# Patient Record
Sex: FEMALE | Race: OTHER | HISPANIC OR LATINO
[De-identification: names, ages, dates, MRNs, and addresses within clinical notes are randomized per-mention and may not be internally consistent; named-entity substitution may affect disease eponyms.]

---

## 2020-11-24 PROBLEM — Z00.00 ENCOUNTER FOR PREVENTIVE HEALTH EXAMINATION: Status: ACTIVE | Noted: 2020-11-24

## 2020-11-25 ENCOUNTER — APPOINTMENT (OUTPATIENT)
Dept: OTOLARYNGOLOGY | Facility: CLINIC | Age: 60
End: 2020-11-25
Payer: MEDICAID

## 2020-11-25 VITALS
SYSTOLIC BLOOD PRESSURE: 112 MMHG | HEIGHT: 59 IN | TEMPERATURE: 96.6 F | BODY MASS INDEX: 20.12 KG/M2 | DIASTOLIC BLOOD PRESSURE: 69 MMHG | HEART RATE: 76 BPM | WEIGHT: 99.8 LBS

## 2020-11-25 DIAGNOSIS — Z87.39 PERSONAL HISTORY OF OTHER DISEASES OF THE MUSCULOSKELETAL SYSTEM AND CONNECTIVE TISSUE: ICD-10-CM

## 2020-11-25 DIAGNOSIS — Z87.19 PERSONAL HISTORY OF OTHER DISEASES OF THE DIGESTIVE SYSTEM: ICD-10-CM

## 2020-11-25 DIAGNOSIS — Z87.09 PERSONAL HISTORY OF OTHER DISEASES OF THE RESPIRATORY SYSTEM: ICD-10-CM

## 2020-11-25 DIAGNOSIS — J30.2 OTHER SEASONAL ALLERGIC RHINITIS: ICD-10-CM

## 2020-11-25 DIAGNOSIS — Z86.69 PERSONAL HISTORY OF OTHER DISEASES OF THE NERVOUS SYSTEM AND SENSE ORGANS: ICD-10-CM

## 2020-11-25 DIAGNOSIS — J30.89 OTHER ALLERGIC RHINITIS: ICD-10-CM

## 2020-11-25 PROCEDURE — 31575 DIAGNOSTIC LARYNGOSCOPY: CPT

## 2020-11-25 PROCEDURE — 99204 OFFICE O/P NEW MOD 45 MIN: CPT | Mod: 25

## 2020-11-25 RX ORDER — MONTELUKAST 10 MG/1
10 TABLET, FILM COATED ORAL
Refills: 0 | Status: ACTIVE | COMMUNITY

## 2020-11-25 RX ORDER — MYCOPHENILIC ACID 360 MG/1
360 TABLET, DELAYED RELEASE ORAL
Refills: 0 | Status: ACTIVE | COMMUNITY

## 2020-11-25 RX ORDER — HYDROXYCHLOROQUINE SULFATE 200 MG/1
200 TABLET, FILM COATED ORAL
Refills: 0 | Status: ACTIVE | COMMUNITY

## 2020-11-29 PROBLEM — Z87.39 HISTORY OF ARTHRITIS: Status: RESOLVED | Noted: 2020-11-29 | Resolved: 2020-11-29

## 2020-11-29 PROBLEM — Z86.69 HISTORY OF GLAUCOMA: Status: RESOLVED | Noted: 2020-11-29 | Resolved: 2020-11-29

## 2020-11-29 PROBLEM — J30.89 ALLERGY TO DUST: Status: ACTIVE | Noted: 2020-11-29

## 2020-11-29 PROBLEM — Z87.09 HISTORY OF ASTHMA: Status: RESOLVED | Noted: 2020-11-29 | Resolved: 2020-11-29

## 2020-11-29 PROBLEM — Z87.39 HISTORY OF OSTEOPOROSIS: Status: RESOLVED | Noted: 2020-11-29 | Resolved: 2020-11-29

## 2020-11-29 PROBLEM — Z87.19 HISTORY OF HEMORRHOIDS: Status: RESOLVED | Noted: 2020-11-29 | Resolved: 2020-11-29

## 2020-11-29 PROBLEM — J30.2 SEASONAL ALLERGIES: Status: ACTIVE | Noted: 2020-11-29

## 2020-11-29 NOTE — PROCEDURE
[de-identified] : \par Indication: Reflux\par -Verbal consent was obtained from patient prior to procedure.\par -Kishore-Synephrine and lidocaine 2% spray applied to the nasal cavities.\par Flexible laryngoscopy was performed via right nostril and revealed the following:\par   -- Nasopharynx had no mass or exudate.\par   -- Base of tongue was symmetric and not enlarged.\par   -- Vallecula was clear\par   -- Epiglottis, both aryepiglottic folds and both false vocal folds were normal\par   -- Arytenoids both with moderate edema and erythema \par   -- True vocal folds were fully mobile and without lesions. \par   -- Post cricoid area was congested\par   -- Interarytenoid edema was present     \par \par The patient tolerated the procedure well.\par \par

## 2020-11-29 NOTE — REVIEW OF SYSTEMS
[Patient Intake Form Reviewed] : Patient intake form was reviewed [As Noted in HPI] : as noted in HPI [Eyesight Problems] : eyesight problems [Shortness Of Breath] : shortness of breath [Constipation] : constipation [Heartburn] : heartburn [Joint Pain] : joint pain [Joint Stiffness] : joint stiffness [Itching] : itching [Negative] : Heme/Lymph [FreeTextEntry9] : back pain [de-identified] : headache [de-identified] : mood swings

## 2020-11-29 NOTE — ASSESSMENT
[FreeTextEntry1] : Ms. Kevin Whitehead has chronic intermittent throat irritation x last 10 months, along with throat clearing and dysphagia, that seem due to active reflux\par -->start famotidine to take in addition to the omeprazole\par -->reflux precautions reviewed with her\par --> GI follow up as scheduled\par \par Return in 3 months\par

## 2020-11-29 NOTE — CONSULT LETTER
[Dear  ___] : Dear  [unfilled], [( Thank you for referring [unfilled] for consultation for _____ )] : Thank you for referring [unfilled] for consultation for [unfilled] [Please see my note below.] : Please see my note below. [Consult Closing:] : Thank you very much for allowing me to participate in the care of this patient.  If you have any questions, please do not hesitate to contact me. [Sincerely,] : Sincerely, [DrAbdullahi  ___] : Dr. NICHOLS [FreeTextEntry2] : Kumar Mead MD\par [address]\[place] [address]\par  [FreeTextEntry3] : \par Cinthia Vazquez MD \par Otolaryngology, Head and Neck Surgery \par \par

## 2020-11-29 NOTE — PHYSICAL EXAM
[Midline] : trachea located in midline position [Normal] : no rashes [] : septum deviated to the left [Laryngoscopy Performed] : laryngoscopy was performed, see procedure section for findings [FreeTextEntry1] : No hoarseness. [de-identified] : 1+ bilateral, no debris or erythema. [de-identified] : Carotid pulses 2+ bilateral.

## 2020-11-29 NOTE — HISTORY OF PRESENT ILLNESS
[de-identified] : Ms Kevin Whitehead is a 60 year old woman who is seen in consultation request from Dr. Mead for throat pain.\par Communication was facilitated by language line interpreters Yi #[id number].\par \par Chronic intermittent throat pain for 10 months; worse in the morning when she wakes up.  Pain is described as a burning sensation. Clearing throat a lot; feels like something is stuck in her throat.  Difficult to swallow solids at times. Dryness in throat also.  May have scleroderma.  No change in sx after antibiotics.\par History of acid reflux, on omeprazole every day but still has heartburn.  Endoscopy 2 years ago showed irritation in her stomach; reports that they gave her antibiotics afterward (?H.pylori).  Has new appt with GI Dr. Madison next month.\par Has asthma and surrounded by dust at home.  Also has seasonal allergies, on medications.\par No change in voice, recent illness or fever.\par

## 2021-02-25 ENCOUNTER — APPOINTMENT (OUTPATIENT)
Dept: OTOLARYNGOLOGY | Facility: CLINIC | Age: 61
End: 2021-02-25

## 2021-10-27 ENCOUNTER — APPOINTMENT (OUTPATIENT)
Dept: OTOLARYNGOLOGY | Facility: CLINIC | Age: 61
End: 2021-10-27
Payer: MEDICAID

## 2021-10-27 VITALS
TEMPERATURE: 96.8 F | HEIGHT: 59 IN | DIASTOLIC BLOOD PRESSURE: 70 MMHG | BODY MASS INDEX: 19.15 KG/M2 | WEIGHT: 95 LBS | SYSTOLIC BLOOD PRESSURE: 109 MMHG | HEART RATE: 68 BPM

## 2021-10-27 DIAGNOSIS — J18.9 PNEUMONIA, UNSPECIFIED ORGANISM: ICD-10-CM

## 2021-10-27 PROCEDURE — 99214 OFFICE O/P EST MOD 30 MIN: CPT | Mod: 25

## 2021-10-27 PROCEDURE — 31575 DIAGNOSTIC LARYNGOSCOPY: CPT

## 2021-10-27 RX ORDER — BECLOMETHASONE DIPROPIONATE 80 UG/1
AEROSOL, METERED RESPIRATORY (INHALATION)
Refills: 0 | Status: COMPLETED | COMMUNITY
End: 2021-10-27

## 2021-10-27 RX ORDER — TRAZODONE HYDROCHLORIDE 50 MG/1
50 TABLET ORAL
Refills: 0 | Status: COMPLETED | COMMUNITY
End: 2021-10-27

## 2021-10-27 RX ORDER — FAMOTIDINE 20 MG/1
20 TABLET, FILM COATED ORAL
Qty: 30 | Refills: 2 | Status: COMPLETED | COMMUNITY
Start: 2020-11-25 | End: 2021-10-27

## 2021-10-27 RX ORDER — LINACLOTIDE 145 UG/1
145 CAPSULE, GELATIN COATED ORAL
Refills: 0 | Status: COMPLETED | COMMUNITY
End: 2021-10-27

## 2021-10-27 NOTE — PHYSICAL EXAM
[Midline] : trachea located in midline position [FreeTextEntry1] : No hoarseness.  [] : septum deviated to the left [Laryngoscopy Performed] : laryngoscopy was performed, see procedure section for findings [de-identified] : Slight white coating on dorsal tongue, which is thickened and has tiny fissures; no lesions. [de-identified] : 1+ bilateral [de-identified] : Red patches flat on hard palate mucosa; tristen slightly with pressue. [Normal] : no neck adenopathy

## 2021-10-27 NOTE — ASSESSMENT
[FreeTextEntry1] : Ms. MOE MCCRARY was evaluated for the following issues today:\par \par 1.) Glossitis due to fungal infection, likely after antibiotics and inhaler use.  \par Hx of esophageal candidiasis this year.\par --> start clotrimazole lozenges 5s/day x 2 weeks\par --> rinse mouth after steroid inhalers\par --> follow up with GI doctor, Dr. Madison\par \par 2.)  Reflux not controlled, on omeprazole and reflux precautions\par --> continue current meds\par --> f/u with GI\par \par 3.) Lung infection - ?TB.\par --> she is to f/u w/ her pulmonologist\par \par Return in 3 weeks

## 2021-10-27 NOTE — CONSULT LETTER
[Dear  ___] : Dear  [unfilled], [Courtesy Letter:] : I had the pleasure of seeing your patient, [unfilled], in my office today. [Please see my note below.] : Please see my note below. [Consult Closing:] : Thank you very much for allowing me to participate in the care of this patient.  If you have any questions, please do not hesitate to contact me. [Sincerely,] : Sincerely, [FreeTextEntry2] : Kumar Mead MD\par [address]\[place] [address]\par  [FreeTextEntry3] : \par Cinthia Vazquez MD \par Otolaryngology, Head and Neck Surgery \par \par  [DrAbdullahi  ___] : Dr. NICHOLS

## 2021-10-27 NOTE — PROCEDURE
[de-identified] : \par Indication: reflux\par -Verbal consent was obtained from patient prior to procedure.\par Flexible laryngoscopy was performed via  mouth and revealed the following:\par   -- Base of tongue was symmetric and not enlarged.\par   -- Vallecula was clear\par   -- Epiglottis, both aryepiglottic folds and both false vocal folds were normal\par   -- Arytenoids both with mild edema and erythema \par   -- True vocal folds were fully mobile and without lesions. \par   -- Post cricoid area was clear.\par   -- Interarytenoid edema was present     \par   -- No lesions or white/red plaques seen in laryngopharynx\par \par The patient tolerated the procedure well.\par

## 2021-10-27 NOTE — HISTORY OF PRESENT ILLNESS
[de-identified] : Ms. MOE MCCRARY presents today with new c/o fungus in her mouth.\par PMH:  Scleroderma, asthma, GERD (?H.pylori hx), arthritis, glaucoma, osteoporosis\par PCP:  Dr. Mead\par Communication was facilitated by language line  [place]se #[id number].\par \par White stuff on tongue and roof of mouth daily x 2 mouths. She picks off the white stuff from her mouth every day but worse in morning when she wakes from sleep.  No pain in mouth.  Able to swallow easily.  No voice change.  \par Nystatin S&S, prescribed by her PCP, was hard for her to use QID so she sometimes only used twice daily --> no change in symptoms.\par In early in 2021, she had same white coating in her mouth ; resolved after washing mouth with vinegar daily.  Tried vinegar this time too but didn’t help. \par She reports esophageal candidiasis was diagnosed on endoscopy earlier this year; treated with an antibiotic x 10 days.  She gets frequent burning in her chest, even on daily omeprazole.  She last saw her GI doctor over 2 months ago. \par Recently on azithromycin, rifampin and ethambutol for a "infection in the lungs". (does not recognize the terms TB or tuberculosis.)  Her pulmonary doctor told her to stop the medications 1 week ago because she feels that the meds  caused her tongue symptoms.\par On Wixela inhaler daily, plus PRN albuterol.\par \par INITIAL VISIT 11/25/2021:\par Chronic intermittent throat pain for 10 months; worse in the morning when she wakes up. Pain is described as a burning sensation. Clearing throat a lot; feels like something is stuck in her throat. Difficult to swallow solids at times. Dryness in throat also. May have scleroderma. No change in sx after antibiotics.\par History of acid reflux, on omeprazole every day but still has heartburn. Endoscopy 2 years ago showed irritation in her stomach; reports that they gave her antibiotics afterward (?H.pylori). Has new appt with GI Dr. Madison next month.\par Has asthma and surrounded by dust at home. Also has seasonal allergies, on medications.\par No change in voice, recent illness or fever.

## 2021-11-24 ENCOUNTER — APPOINTMENT (OUTPATIENT)
Dept: OTOLARYNGOLOGY | Facility: CLINIC | Age: 61
End: 2021-11-24
Payer: MEDICAID

## 2021-11-24 VITALS
WEIGHT: 95 LBS | DIASTOLIC BLOOD PRESSURE: 69 MMHG | TEMPERATURE: 98 F | BODY MASS INDEX: 19.15 KG/M2 | SYSTOLIC BLOOD PRESSURE: 100 MMHG | HEIGHT: 59 IN | HEART RATE: 66 BPM

## 2021-11-24 PROCEDURE — 31575 DIAGNOSTIC LARYNGOSCOPY: CPT

## 2021-11-24 PROCEDURE — 99214 OFFICE O/P EST MOD 30 MIN: CPT | Mod: 25

## 2021-11-24 RX ORDER — CLOTRIMAZOLE 10 MG/1
10 LOZENGE ORAL
Qty: 150 | Refills: 0 | Status: COMPLETED | COMMUNITY
Start: 2021-10-27 | End: 2021-11-11

## 2021-11-24 NOTE — PROCEDURE
[de-identified] : \par Indication:  throat pain\par -Verbal consent was obtained from patient prior to procedure.\par Kishore-Synephrine spray was applied to the nasal cavities.\par Flexible laryngoscopy was performed via right nostril and revealed the following:\par   -- NP clear.\par   -- Base of tongue was symmetric and not enlarged.\par   -- Vallecula was clear\par   -- Epiglottis, both aryepiglottic folds and both false vocal folds were normal in shape by mild erythema.\par   -- Arytenoids both with mild edema and erythema \par   -- True vocal folds were fully mobile and without lesions. \par   -- Post cricoid area was clear.\par   -- Interarytenoid edema was present     \par   -- No lesions or white/red plaques seen in laryngopharynx but overall pharyngeal mucosa with mild to moderate erythema, minimal edema.\par \par The patient tolerated the procedure well.\par

## 2021-11-24 NOTE — HISTORY OF PRESENT ILLNESS
[de-identified] : Ms. MOE MCCRARY was seen in f/up for oral candidiasis.\par PMH:  Scleroderma, asthma, GERD (?H.pylori hx), arthritis, glaucoma, osteoporosis\par PCP:  Dr. Mead\par Communication was facilitated by language line  Kyrgyz #[id number].\par \guido Started on clotrimazole lozenges 3 weeks ago for oral candidiasis. The white coating and redness on tongue and hard palate disappeared but started to return over past 2 days.  Every morning she washes her mouth with vinegar. She is rinsing her mouth after using her steroid inhalers, as instructed.\par Reflux is improved; occurs when she take osteoporosis medications. She is on omeprazole 40 mg every day.\par She saw the GI doctor, who scheduled a December 6 procedure where they put something in her nose for 24 hours for reflux. (pH probe?)\par Pulmonary and ID doctors ordered CT chest to see if the lung infection is gone.\par On Saturday 11/20/21, she ate something hard, after which she had throat pain for 2 days. She felt like something was stuck in her throat and she had trouble swallowing her saliva. Sx disappeared 3 days ago. Today she has just a little discomfort in the throat.\par \par VISIT 10/27/2021\par White stuff on tongue and roof of mouth daily x 2 mouths. She picks off the white stuff from her mouth every day but worse in morning when she wakes from sleep.  No pain in mouth.  Able to swallow easily.  No voice change.  \par Nystatin S&S, prescribed by her PCP, was hard for her to use QID so she sometimes only used twice daily --> no change in symptoms.\par In early in 2021, she had same white coating in her mouth ; resolved after washing mouth with vinegar daily.  Tried vinegar this time too but didn’t help. \par She reports esophageal candidiasis was diagnosed on endoscopy earlier this year; treated with an antibiotic x 10 days.  She gets frequent burning in her chest, even on daily omeprazole.  She last saw her GI doctor over 2 months ago. \par Recently on azithromycin, rifampin and ethambutol for a "infection in the lungs". (does not recognize the terms TB or tuberculosis.)  Her pulmonary doctor told her to stop the medications 1 week ago because she feels that the meds  caused her tongue symptoms.\par On Wixela inhaler daily, plus PRN albuterol.\par \par INITIAL VISIT 11/25/2021:\par Chronic intermittent throat pain for 10 months; worse in the morning when she wakes up. Pain is described as a burning sensation. Clearing throat a lot; feels like something is stuck in her throat. Difficult to swallow solids at times. Dryness in throat also. May have scleroderma. No change in sx after antibiotics.\par History of acid reflux, on omeprazole every day but still has heartburn. Endoscopy 2 years ago showed irritation in her stomach; reports that they gave her antibiotics afterward (?H.pylori). Has new appt with GI Dr. Madison next month.\par Has asthma and surrounded by dust at home. Also has seasonal allergies, on medications.\par No change in voice, recent illness or fever.

## 2021-11-24 NOTE — ASSESSMENT
[FreeTextEntry1] : Ms. MOE MCCRARY was evaluated for the following issues today:\par \par 1.) Oral candidiasis resolved for about 10 days after treated with 2 weeks of clotrimazole lozenges but came back a few days ago.  Now the throat appears globally inflamed, although no white plaques; suspect candida.  \par Hx of esophageal candidiasis early this year, treated with oral pills (fluconazole?).\par --> restart clotrimazole lozenges 5s/day, this time x 1 month\par --> Fluconazole 100 mg daily x 2 weeks.\par --> continue rinse mouth after steroid inhalers\par \par 2.)  Reflux better controlled, on omeprazole and reflux precautions\par --> continue current meds\par --> f/u with GI\par \par Return in 6 weeks

## 2021-11-24 NOTE — PHYSICAL EXAM
[] : septum deviated to the left [Midline] : trachea located in midline position [Laryngoscopy Performed] : laryngoscopy was performed, see procedure section for findings [FreeTextEntry1] : No hoarseness.  [de-identified] : No white coating on dorsal tongue, just erythema and mild midline fissure. [de-identified] : 1+ bilateral [de-identified] : Small flat red patches on hard palate mucosa; fewer than at prior visit. [Normal] : no neck adenopathy

## 2021-11-24 NOTE — CONSULT LETTER
[Dear  ___] : Dear  [unfilled], [Courtesy Letter:] : I had the pleasure of seeing your patient, [unfilled], in my office today. [Please see my note below.] : Please see my note below. [Consult Closing:] : Thank you very much for allowing me to participate in the care of this patient.  If you have any questions, please do not hesitate to contact me. [Sincerely,] : Sincerely, [FreeTextEntry2] : Kumar Mead MD\par [address]\[place] [address]\par  [FreeTextEntry3] : \par Cinthia Vazquez MD \par Otolaryngology, Head and Neck Surgery \par \par  [DrAbdullahi  ___] : Dr. NICHOLS

## 2022-01-19 ENCOUNTER — APPOINTMENT (OUTPATIENT)
Dept: OTOLARYNGOLOGY | Facility: CLINIC | Age: 62
End: 2022-01-19
Payer: MEDICAID

## 2022-01-19 VITALS
HEART RATE: 70 BPM | TEMPERATURE: 98.3 F | WEIGHT: 95 LBS | HEIGHT: 59 IN | DIASTOLIC BLOOD PRESSURE: 68 MMHG | SYSTOLIC BLOOD PRESSURE: 122 MMHG | BODY MASS INDEX: 19.15 KG/M2

## 2022-01-19 DIAGNOSIS — U07.1 COVID-19: ICD-10-CM

## 2022-01-19 DIAGNOSIS — B37.89 OTHER SITES OF CANDIDIASIS: ICD-10-CM

## 2022-01-19 DIAGNOSIS — B37.81 CANDIDAL ESOPHAGITIS: ICD-10-CM

## 2022-01-19 PROCEDURE — 31575 DIAGNOSTIC LARYNGOSCOPY: CPT

## 2022-01-19 PROCEDURE — 99214 OFFICE O/P EST MOD 30 MIN: CPT | Mod: 25

## 2022-01-19 RX ORDER — FLUCONAZOLE 100 MG/1
100 TABLET ORAL DAILY
Qty: 14 | Refills: 0 | Status: COMPLETED | COMMUNITY
Start: 2021-11-24 | End: 2022-01-19

## 2022-02-22 PROBLEM — B37.81 CANDIDIASIS, ESOPHAGEAL: Status: RESOLVED | Noted: 2021-10-27 | Resolved: 2022-02-22

## 2022-02-22 PROBLEM — U07.1 COVID-19 VIRUS INFECTION: Status: RESOLVED | Noted: 2022-02-22 | Resolved: 2022-02-22

## 2022-02-22 PROBLEM — B37.89 PHARYNGEAL CANDIDIASIS: Status: RESOLVED | Noted: 2021-11-24 | Resolved: 2022-02-22

## 2022-02-22 RX ORDER — FAMOTIDINE 20 MG/1
20 TABLET, FILM COATED ORAL
Refills: 0 | Status: ACTIVE | COMMUNITY

## 2022-02-22 NOTE — PHYSICAL EXAM
[Midline] : trachea located in midline position [FreeTextEntry1] : No hoarseness.  [] : septum deviated to the left [de-identified] : teeth impression marks on lateral tongue.  No white coating. [de-identified] : dry [Normal] : no neck adenopathy

## 2022-02-22 NOTE — HISTORY OF PRESENT ILLNESS
[de-identified] : Ms. MOE MCCRARY presents for follow up mouth and throat problems.\par PMH: Scleroderma, asthma, GERD (?H.pylori hx), arthritis, glaucoma, osteoporosis\par PCP: Dr. Mead\par Communication was facilitated by language line  [place]an #[id number]\par \par She had COViD infection December 29, 2021, which worsened her throat pain. No SOB. \par Now she has just a little bit of throat pain, worse in the morning. Sometimes she cant swallow her saliva due to pain but today it is better.   She is gargling salt water.  She is now on famotidine  and omeprazole which is helping symptoms..\par She had a test where they looked down her nose into her stomach (pH probe?), she said no findings. She is following up with him in February. \par \par In the mornings she wakes up with her tongue white but not as bad as it was. The clotrimazole lozenges helped but she still gets the white coating. No tongue burning.\par \par VISIT (11/24/2021)\guido Started on clotrimazole lozenges 3 weeks ago for oral candidiasis. The white coating and redness on tongue and hard palate disappeared but started to return over past 2 days. Every morning she washes her mouth with vinegar. She is rinsing her mouth after using her steroid inhalers, as instructed.\par Reflux is improved; occurs when she take osteoporosis medications. She is on omeprazole 40 mg every day.\par She saw the GI doctor, who scheduled a December 6 procedure where they put something in her nose for 24 hours for reflux. (pH probe?)\par Pulmonary and ID doctors ordered CT chest to see if the lung infection is gone.\par On Saturday 11/20/21, she ate something hard, after which she had throat pain for 2 days. She felt like something was stuck in her throat and she had trouble swallowing her saliva. Sx disappeared 3 days ago. Today she has just a little discomfort in the throat.\par \par VISIT 10/27/2021\par White stuff on tongue and roof of mouth daily x 2 mouths. She picks off the white stuff from her mouth every day but worse in morning when she wakes from sleep. No pain in mouth. Able to swallow easily. No voice change. \par Nystatin S&S, prescribed by her PCP, was hard for her to use QID so she sometimes only used twice daily --> no change in symptoms.\par In early in 2021, she had same white coating in her mouth ; resolved after washing mouth with vinegar daily. Tried vinegar this time too but didn’t help. \par She reports esophageal candidiasis was diagnosed on endoscopy earlier this year; treated with an antibiotic x 10 days. She gets frequent burning in her chest, even on daily omeprazole. She last saw her GI doctor over 2 months ago. \par Recently on azithromycin, rifampin and ethambutol for a "infection in the lungs". (does not recognize the terms TB or tuberculosis.) Her pulmonary doctor told her to stop the medications 1 week ago because she feels that the meds caused her tongue symptoms.\par On Wixela inhaler daily, plus PRN albuterol.\par \par INITIAL VISIT 11/25/2021:\par Chronic intermittent throat pain for 10 months; worse in the morning when she wakes up. Pain is described as a burning sensation. Clearing throat a lot; feels like something is stuck in her throat. Difficult to swallow solids at times. Dryness in throat also. May have scleroderma. No change in sx after antibiotics.\par History of acid reflux, on omeprazole every day but still has heartburn. Endoscopy 2 years ago showed irritation in her stomach; reports that they gave her antibiotics afterward (?H.pylori). Has new appt with GI Dr. Madison next month.\par Has asthma and surrounded by dust at home. Also has seasonal allergies, on medications.\par No change in voice, recent illness or fever.

## 2022-02-22 NOTE — PROCEDURE
[de-identified] : \par Indication: odynophagia\par -Verbal consent was obtained from patient prior to procedure.\par Flexible laryngoscopy was performed via mouth and revealed the following:\par   -- mucosa is dry\par   -- Base of tongue was symmetric and not enlarged.\par   -- Vallecula was clear\par   -- Epiglottis, both aryepiglottic folds and both false vocal folds were normal\par   -- Arytenoids both with mild edema and no  erythema \par   -- True vocal folds were fully mobile and without lesions. \par   -- Post cricoid area was clear.\par   -- Interarytenoid edema was present     \par \par The patient tolerated the procedure well.\par

## 2022-02-22 NOTE — ASSESSMENT
[FreeTextEntry1] : Ms. MOE MCCRARY was evaluated for the following issues today:\par \par 1.) Oral candidiasis resolved after treated with 2 weeks of clotrimazole lozenges. No white coating noted today.\par She has impressions on lateral tongue surfaces due to the teeth.\par Today the throat appears dry, no inflammation, no white plaques.\par Hx of esophageal candidiasis early this year, treated with oral pills (fluconazole?).\par --> start Xylimelts\par --> continue rinse mouth after steroid inhalers\par \par 2.) Reflux likely worsened after COVID infection. Now controlled, on omeprazole, famotidine and reflux precautions\par --> continue current meds\par --> f/u with GI\par \par I would be happy to see her again as needed.

## 2022-02-22 NOTE — CONSULT LETTER
[Dear  ___] : Dear  [unfilled], [Courtesy Letter:] : I had the pleasure of seeing your patient, [unfilled], in my office today. [Please see my note below.] : Please see my note below. [Consult Closing:] : Thank you very much for allowing me to participate in the care of this patient.  If you have any questions, please do not hesitate to contact me. [Sincerely,] : Sincerely, [FreeTextEntry2] : Kumar Mead MD\par [address]\[place] [address]\par  [FreeTextEntry3] : \par Cinthia Vazquez MD \par Otolaryngology, Head and Neck Surgery \par \par  [DrAbdullahi  ___] : Dr. NICHOLS

## 2022-10-05 ENCOUNTER — APPOINTMENT (OUTPATIENT)
Dept: OTOLARYNGOLOGY | Facility: CLINIC | Age: 62
End: 2022-10-05
Payer: MEDICAID

## 2022-10-05 VITALS
TEMPERATURE: 96.8 F | BODY MASS INDEX: 19.15 KG/M2 | WEIGHT: 95 LBS | HEIGHT: 59 IN | HEART RATE: 83 BPM | DIASTOLIC BLOOD PRESSURE: 69 MMHG | SYSTOLIC BLOOD PRESSURE: 105 MMHG

## 2022-10-05 DIAGNOSIS — Z86.19 PERSONAL HISTORY OF OTHER INFECTIOUS AND PARASITIC DISEASES: ICD-10-CM

## 2022-10-05 PROCEDURE — 99213 OFFICE O/P EST LOW 20 MIN: CPT | Mod: 25

## 2022-10-05 PROCEDURE — 31575 DIAGNOSTIC LARYNGOSCOPY: CPT

## 2023-03-06 ENCOUNTER — OUTPATIENT (OUTPATIENT)
Dept: OUTPATIENT SERVICES | Facility: HOSPITAL | Age: 63
LOS: 1 days | End: 2023-03-06
Payer: MEDICAID

## 2023-03-06 ENCOUNTER — APPOINTMENT (OUTPATIENT)
Dept: ORTHOPEDIC SURGERY | Facility: CLINIC | Age: 63
End: 2023-03-06
Payer: MEDICAID

## 2023-03-06 VITALS
SYSTOLIC BLOOD PRESSURE: 113 MMHG | DIASTOLIC BLOOD PRESSURE: 73 MMHG | HEIGHT: 59 IN | HEART RATE: 82 BPM | BODY MASS INDEX: 20.16 KG/M2 | OXYGEN SATURATION: 98 % | WEIGHT: 100 LBS

## 2023-03-06 DIAGNOSIS — Z78.9 OTHER SPECIFIED HEALTH STATUS: ICD-10-CM

## 2023-03-06 PROCEDURE — 99203 OFFICE O/P NEW LOW 30 MIN: CPT

## 2023-03-06 PROCEDURE — 73080 X-RAY EXAM OF ELBOW: CPT | Mod: 26,RT

## 2023-03-06 PROCEDURE — 73080 X-RAY EXAM OF ELBOW: CPT

## 2023-03-07 NOTE — ASSESSMENT
[FreeTextEntry1] : NOLAN MAZARIEGOS is a 63 year old female with right elbow pain.\par I discussed with the patient that their symptoms, signs, and imaging are most consistent with olecranon bursitis with spontaneous drainage via sinus.\par We reviewed the natural history of this condition and treatment options.\par  We agreed on the following plan:\par \par XR right elbow today.\par Attempt to collect fluid for analysis unsuccessful as could only obtain a few drops (clear, yellow without purulence).\par Refer to Surgery (Dr. Cuevas) for possible I&D, lavage, closure of sinus.\par Explained infection risk of sinus and instructed to monitor for signs of infection (warm, red, swelling, purulent discharge, constitutional symptoms). Advised to present to ED if symptomatic prior to appointment.\par Follow up prn.\par

## 2023-03-07 NOTE — PHYSICAL EXAM
[de-identified] : General: Well-nourished, well-developed, alert, and in no acute distress.\par Head: Normocephalic.\par Eyes: Pupils equal, extraocular muscles intact, normal sclera.\par Nose: No nasal discharge.\par Cardiovascular: Extremities are warm and well perfused. Distal pulses are symmetric bilaterally.\par Respiratory: No labored breathing.\par Extremities: Sensation is intact distally bilaterally. Distal pulses are symmetric bilaterally\par Lymphatic: No regional lymphadenopathy, no lymphedema\par Neurologic: No focal deficits\par Skin: Normal skin color, texture, and turgor\par Psychiatric: Normal affect \par \par MSK: \par Examination of right elbow:\par Slight olecranon effusion with sinus draining clear, yellow serous fluid\par No surrounding erythema, warmth, hematoma or purulent discharge\par AROM: flexion 150 deg, extension 180 deg, pronation 75 deg and supination 90 deg \par No pain at end of flexion and supination\par No locking or crepitus\par Tender to palpation biceps tendon insertion\par Nontender to palpation: medial epicondyle, lateral epicondyle, olecranon, triceps tendon insertion \par \par Special tests:\par Valgus stress negative pain, laxity\par Varus stress  negative pain, laxity\par Milking maneuver  negative pain, laxity\par Cozen's negative\par Maudsley negative\par Nontender to palpation over medial epicondyle with wrist flexion\par Tinel's sign negative over Ulnar nerve at cubital tunnel\par \par Sensation is intact to light touch over the median, radial, and ulnar nerve distributions bilaterally. Capillary refill is less than two seconds. Radial pulses 2+ equal bilaterally. Strength testing shows 5/5 biceps, 5/5 triceps. 5/5 wrist extension, 5/5 intrinsics. \par Reflexes 2+ biceps, brachioradialis.\par  [de-identified] : XR right elbow (3/6/23): There is no evidence of fracture or dislocation. No evidence of osteomyelitis. No soft tissue swelling.

## 2023-03-07 NOTE — HISTORY OF PRESENT ILLNESS
[de-identified] : NOLAN MCCRARY is a 63 year old female who presents with right elbow pain.\par Patient is right-hand dominant.\par States the onset of pain was after she struck elbow approx 6-7 weeks ago.\par The elbow became slightly red, warm and swollen then she noticed a small hole in her skin that was draining initially clear, yellow fluid for last 4 weeks.\par Pain is described sharp has improved somewhat. \par Nonradiating.\par There is no associated stiffness, numbness, paraesthesia or weakness.\par No fever or other constitutional symptoms.\par Exacerbating factors are lifting/carrying, grasping. \par Cleaned area with hydrogen peroxide, applied warm compress\par Took Tylenol with some effect.\par Presented to ED last week at [facility]. Had XR (images not available). Advised to follow up with Ortho.\par \par  Vero # [id number]

## 2023-03-14 ENCOUNTER — LABORATORY RESULT (OUTPATIENT)
Age: 63
End: 2023-03-14

## 2023-03-14 ENCOUNTER — APPOINTMENT (OUTPATIENT)
Dept: ORTHOPEDIC SURGERY | Facility: CLINIC | Age: 63
End: 2023-03-14
Payer: MEDICAID

## 2023-03-14 VITALS — WEIGHT: 100 LBS | RESPIRATION RATE: 16 BRPM | HEIGHT: 59 IN | BODY MASS INDEX: 20.16 KG/M2

## 2023-03-14 PROCEDURE — 99204 OFFICE O/P NEW MOD 45 MIN: CPT | Mod: 25

## 2023-03-14 PROCEDURE — 20605 DRAIN/INJ JOINT/BURSA W/O US: CPT

## 2023-03-14 PROCEDURE — 73070 X-RAY EXAM OF ELBOW: CPT | Mod: RT

## 2023-03-14 NOTE — PHYSICAL EXAM
[de-identified] : Physical exam demonstrates the patient to be alert and oriented x 3 and capable of ambulation. The patient is well-developed and well-nourished in no apparent respiratory distress. The majority of the skin is intact bilaterally in the upper extremities without any bilateral elbow lymphadenopathy.\par \par Evaluation of both elbows reveals full symmetric range of motion from full extension to 140° of flexion with full pronation and full supination.  There is swelling over the right olecranon bursa without any significant erythema.  Fluctuance is appreciated.  No expressible drainage.  No open wounds.  Negative Tinel's over the right cubital tunnel.\par \par The wrists have symmetric range of motion bilaterally. There is no tenderness over the scaphoid, scapholunate, or lunotriquetral ligaments bilaterally. There is a negative Dahl's test bilaterally. There is no tenderness over the distal radial ulnar joint or TFCC and no evidence of instability bilaterally. There is no tenderness over the pisotriquetral joint, hamate hook, or CMC joints bilaterally. The patient is nontender over both scaphoids and anatomic snuffbox is bilaterally. There is no clubbing cyanosis or edema.\par \par Full, symmetric digital ROM. \par \par There is good capillary refill of the digits bilaterally. There are no masses palpated or sensitivity over the median and ulnar nerves at the level of the wrist.  Full 5/5 strength at the right FDI, index FDP and EIP. Sensation is intact to light touch bilaterally.\par  [de-identified] : AP and lateral x-ray of the right elbow was obtained today to assess for bony injury.  Soft tissue swelling over the right olecranon.  Radiopaque calcifications noted within the soft tissue swelling.  No appreciation of fracture or dislocation.  Osteopenia noted at the right elbow.

## 2023-03-14 NOTE — PROCEDURE
[FreeTextEntry1] :  The risks, benefits, and alternatives of a right olecranon bursitis aspiration were discussed with the patient. This included, but was not limited to nerve injury, infection, subcutaneous atrophy, skin depigmentation etc. Using sterile technique (alcohol pads) the posterior aspect the patient's right elbow was prepped.  An 18-gauge needle was then inserted directly into the olecranon bursa evacuating 5 cc of purulent fluid.  Patient did not feel any numbness and tingling into the hand during this procedure.\par \par \par \par

## 2023-03-14 NOTE — ASSESSMENT
[FreeTextEntry1] : My impression is that the patient has a septic right elbow olecranon bursitis.  Treatment options were discussed and I recommended aspiration/drainage of the patient's right olecranon bursa in addition to beginning a p.o. regimen of antibiotics.  I also instructed the patient not to place direct pressure onto the right olecranon and to keep the elbow well-padded.  This was instructed today in the office as a place to bulky dressing.  I also did not want the patient to get the posterior elbow wet for the next 5 days.  I directed she follow back up with me in 2 weeks for reevaluation or sooner if any worsening symptoms, redness, or drainage develop.  She was in accordance with the plan.

## 2023-03-14 NOTE — HISTORY OF PRESENT ILLNESS
[Right] : right hand dominant [FreeTextEntry1] : 63 year old female presenting for evaluation right elbow swelling.  She notes this is her ongoing for about 7 weeks, she feels like she may have banged the elbow.  She notes that she has mild pain/discomfort.  She also notes that at times, she has noticed oozing out of the elbow along with purulence.  She been keeping a Band-Aid on it.  No fevers or chills.\par \par She works as a home attendant.\par \par No history of gout or diabetes.\par \par Encounter facilitated by Rufina (), ID number [id number].

## 2023-03-20 PROBLEM — Z86.19 HISTORY OF CANDIDIASIS OF MOUTH: Status: RESOLVED | Noted: 2021-10-27 | Resolved: 2023-03-20

## 2023-03-20 RX ORDER — OMEPRAZOLE 40 MG/1
40 CAPSULE, DELAYED RELEASE ORAL
Refills: 0 | Status: DISCONTINUED | COMMUNITY
End: 2022-10-05

## 2023-03-20 RX ORDER — MIRTAZAPINE 30 MG/1
30 TABLET, FILM COATED ORAL
Refills: 0 | Status: ACTIVE | COMMUNITY

## 2023-03-20 NOTE — PROCEDURE
[de-identified] : \par Indication: dysphagia\par -Verbal consent was obtained from patient prior to procedure.\par Flexible laryngoscopy was performed via nose and revealed the following:\par   -- Nasopharynx clear.\par   -- Pharyngeal mucosa is dry\par   -- Base of tongue was symmetric and not enlarged.\par   -- Vallecula was clear\par   -- Epiglottis, both aryepiglottic folds and both false vocal folds were normal\par   -- Arytenoids both with mild-moderate edema and mild erythema \par   -- True vocal folds were fully mobile and without lesions. \par   -- Post cricoid area was clear.\par   -- Interarytenoid edema was present     \par   -- No lesions seen in laryngopharynx\par \par The patient tolerated the procedure well.\par

## 2023-03-20 NOTE — ASSESSMENT
[FreeTextEntry1] : Ms. MOE MCCRARY has very bothersome chronic throat pain (diffuse) and odynophagia for the past few months, along with large pill dysphagia for several years.\par I do not seen any lesions in the laryngopharynx or evidence of a fungal infection.\par She has uncontrolled reflux that is affecting the stomach and laryngopharynx.\par --> continue current meds\par --> f/u with GI\par \par I would be happy to see her again as needed.

## 2023-03-20 NOTE — HISTORY OF PRESENT ILLNESS
[de-identified] : PMH: Scleroderma, asthma, GERD (?H.pylori hx), arthritis, glaucoma, osteoporosis\par PCP: Dr. Marcia Mathur\par Communication was facilitated by language line  [employer] #[id number].\par \par Ms. MOE MCCRARY presents for mouth and throat problems.\par She has worse throat pain and dryness all the time for the past few months.  She has trouble swallowing, especially large pills, for years.\par GI Dr Wolfe told her that she has gastritis.  She has abdominal bloating often.\par She is on Dexilant 60 mg daily for GERD.\par Reportedly, EGD in July 2022 showed no candida infection.\par She is on meds for ASHLEY.\par No postnasal drip, voice change, ear pain or unexplained wt loss.\par \par VISIT 01/19/2022\par She had COViD infection December 29, 2021, which worsened her throat pain. No SOB. \par Now she has just a little bit of throat pain, worse in the morning. Sometimes she cant swallow her saliva due to pain but today it is better.   She is gargling salt water.  She is now on famotidine  and omeprazole which is helping symptoms..\par She had a test where they looked down her nose into her stomach (pH probe?), she said no findings. She is following up with him in February. \par In the mornings she wakes up with her tongue white but not as bad as it was. The clotrimazole lozenges helped but she still gets the white coating. No tongue burning.\par \par VISIT 11/24/2021\par Started on clotrimazole lozenges 3 weeks ago for oral candidiasis. The white coating and redness on tongue and hard palate disappeared but started to return over past 2 days. Every morning she washes her mouth with vinegar. She is rinsing her mouth after using her steroid inhalers, as instructed.\par Reflux is improved; occurs when she take osteoporosis medications. She is on omeprazole 40 mg every day.\par She saw the GI doctor, who scheduled a December 6 procedure where they put something in her nose for 24 hours for reflux. (pH probe?)\par Pulmonary and ID doctors ordered CT chest to see if the lung infection is gone.\par On Saturday 11/20/21, she ate something hard, after which she had throat pain for 2 days. She felt like something was stuck in her throat and she had trouble swallowing her saliva. Sx disappeared 3 days ago. Today she has just a little discomfort in the throat.\par \par VISIT 10/27/2021\par White stuff on tongue and roof of mouth daily x 2 mouths. She picks off the white stuff from her mouth every day but worse in morning when she wakes from sleep. No pain in mouth. Able to swallow easily. No voice change. \par Nystatin S&S, prescribed by her PCP, was hard for her to use QID so she sometimes only used twice daily --> no change in symptoms.\par In early in 2021, she had same white coating in her mouth ; resolved after washing mouth with vinegar daily. Tried vinegar this time too but didn’t help. \par She reports esophageal candidiasis was diagnosed on endoscopy earlier this year; treated with an antibiotic x 10 days. She gets frequent burning in her chest, even on daily omeprazole. She last saw her GI doctor over 2 months ago. \par Recently on azithromycin, rifampin and ethambutol for a "infection in the lungs". (does not recognize the terms TB or tuberculosis.) Her pulmonary doctor told her to stop the medications 1 week ago because she feels that the meds caused her tongue symptoms.\par On Wixela inhaler daily, plus PRN albuterol.\par \par INITIAL VISIT 11/25/2021:\par Chronic intermittent throat pain for 10 months; worse in the morning when she wakes up. Pain is described as a burning sensation. Clearing throat a lot; feels like something is stuck in her throat. Difficult to swallow solids at times. Dryness in throat also. May have scleroderma. No change in sx after antibiotics.\par History of acid reflux, on omeprazole every day but still has heartburn. Endoscopy 2 years ago showed irritation in her stomach; reports that they gave her antibiotics afterward (?H.pylori). Has new appt with GI Dr. Madison next month.\par Has asthma and surrounded by dust at home. Also has seasonal allergies, on medications.\par No change in voice, recent illness or fever. \par

## 2023-03-20 NOTE — CONSULT LETTER
[Dear  ___] : Dear  [unfilled], [Courtesy Letter:] : I had the pleasure of seeing your patient, [unfilled], in my office today. [Please see my note below.] : Please see my note below. [Consult Closing:] : Thank you very much for allowing me to participate in the care of this patient.  If you have any questions, please do not hesitate to contact me. [Sincerely,] : Sincerely, [DrAbdullahi  ___] : Dr. NICHOLS [FreeTextEntry2] : Kumar Mead MD\par [address]\[place] [address]\par  [FreeTextEntry3] : \par Cinthia Vazquez MD \par Otolaryngology, Head and Neck Surgery \par \par

## 2023-03-20 NOTE — PHYSICAL EXAM
[FreeTextEntry1] : No hoarseness.  [] : septum deviated to the left [Midline] : trachea located in midline position [Laryngoscopy Performed] : laryngoscopy was performed, see procedure section for findings [de-identified] : teeth impression marks on lateral tongue.  No white coating. [de-identified] : dry mucosa [Normal] : no neck adenopathy

## 2023-04-04 ENCOUNTER — APPOINTMENT (OUTPATIENT)
Dept: ORTHOPEDIC SURGERY | Facility: CLINIC | Age: 63
End: 2023-04-04
Payer: MEDICAID

## 2023-04-04 PROCEDURE — 99213 OFFICE O/P EST LOW 20 MIN: CPT

## 2023-04-27 ENCOUNTER — APPOINTMENT (OUTPATIENT)
Dept: ORTHOPEDIC SURGERY | Facility: CLINIC | Age: 63
End: 2023-04-27
Payer: MEDICAID

## 2023-04-27 DIAGNOSIS — M70.31 OTHER BURSITIS OF ELBOW, RIGHT ELBOW: ICD-10-CM

## 2023-04-27 PROCEDURE — ZZZZZ: CPT

## 2023-05-19 ENCOUNTER — RESULT REVIEW (OUTPATIENT)
Age: 63
End: 2023-05-19

## 2023-05-19 ENCOUNTER — APPOINTMENT (OUTPATIENT)
Dept: OTOLARYNGOLOGY | Facility: CLINIC | Age: 63
End: 2023-05-19
Payer: MEDICAID

## 2023-05-19 VITALS
TEMPERATURE: 96.9 F | HEIGHT: 59 IN | SYSTOLIC BLOOD PRESSURE: 116 MMHG | WEIGHT: 103.2 LBS | HEART RATE: 69 BPM | BODY MASS INDEX: 20.8 KG/M2 | DIASTOLIC BLOOD PRESSURE: 71 MMHG

## 2023-05-19 DIAGNOSIS — R09.89 OTHER SPECIFIED SYMPTOMS AND SIGNS INVOLVING THE CIRCULATORY AND RESPIRATORY SYSTEMS: ICD-10-CM

## 2023-05-19 DIAGNOSIS — Z87.19 PERSONAL HISTORY OF OTHER DISEASES OF THE DIGESTIVE SYSTEM: ICD-10-CM

## 2023-05-19 DIAGNOSIS — Z87.898 PERSONAL HISTORY OF OTHER SPECIFIED CONDITIONS: ICD-10-CM

## 2023-05-19 PROBLEM — K21.9 GASTRO-ESOPHAGEAL REFLUX DISEASE WITHOUT ESOPHAGITIS: Chronic | Status: ACTIVE | Noted: 2023-05-16

## 2023-05-19 PROBLEM — M81.0 AGE-RELATED OSTEOPOROSIS WITHOUT CURRENT PATHOLOGICAL FRACTURE: Chronic | Status: ACTIVE | Noted: 2023-05-16

## 2023-05-19 PROCEDURE — 31575 DIAGNOSTIC LARYNGOSCOPY: CPT

## 2023-05-19 PROCEDURE — 99214 OFFICE O/P EST MOD 30 MIN: CPT | Mod: 25

## 2023-05-19 RX ORDER — ACETAMINOPHEN AND CODEINE PHOSPHATE 300; 30 MG/1; MG/1
300-30 TABLET ORAL
Qty: 20 | Refills: 0 | Status: COMPLETED | COMMUNITY
Start: 2023-05-16 | End: 2023-05-19

## 2023-05-19 RX ORDER — SODIUM CHLORIDE 0.65 %
0.65 AEROSOL, SPRAY (ML) NASAL 4 TIMES DAILY
Qty: 2 | Refills: 3 | Status: ACTIVE | COMMUNITY
Start: 2023-05-19 | End: 1900-01-01

## 2023-05-19 RX ORDER — SULFAMETHOXAZOLE AND TRIMETHOPRIM 800; 160 MG/1; MG/1
800-160 TABLET ORAL TWICE DAILY
Qty: 20 | Refills: 0 | Status: COMPLETED | COMMUNITY
Start: 2023-03-14 | End: 2023-05-19

## 2023-05-19 NOTE — HISTORY OF PRESENT ILLNESS
[de-identified] : PMH: Scleroderma, asthma, GERD (?H.pylori hx), arthritis, glaucoma, osteoporosis\par PCP: Dr. Marcia Antonio\par Communication was facilitated by language line  [employer] #[id number].\par \par Difficulty swallowing her pills and crunchy or hard foods is getting worse.  Has to crush her big pills.\par Sometimes has throat pain, always in the mornings.  Had URI 3 weeks ago, with sore throat.\par Taking Dexilant in AM and famotidine at bedtime.\par Using fluticasone nasal spray for allergies\par \par \par VISIT 10/05/2022\par Ms. MOE MCCRARY presents for mouth and throat problems.\par Communication was facilitated by language line  [employer] #[id number].\par She has worse throat pain and dryness all the time for the past few months.  She has trouble swallowing, especially large pills, for years.\par GI Dr Wolfe told her that she has gastritis.  She has abdominal bloating often.\par She is on Dexilant 60 mg daily for GERD.\par Reportedly, EGD in July 2022 showed no candida infection.\par She is on meds for ASHLEY.\par No postnasal drip, voice change, ear pain or unexplained wt loss.\par \par VISIT 01/19/2022\par She had COViD infection December 29, 2021, which worsened her throat pain. No SOB. \par Now she has just a little bit of throat pain, worse in the morning. Sometimes she cant swallow her saliva due to pain but today it is better.   She is gargling salt water.  She is now on famotidine  and omeprazole which is helping symptoms..\par She had a test where they looked down her nose into her stomach (pH probe?), she said no findings. She is following up with him in February. \par In the mornings she wakes up with her tongue white but not as bad as it was. The clotrimazole lozenges helped but she still gets the white coating. No tongue burning.\par \par VISIT 11/24/2021\par Started on clotrimazole lozenges 3 weeks ago for oral candidiasis. The white coating and redness on tongue and hard palate disappeared but started to return over past 2 days. Every morning she washes her mouth with vinegar. She is rinsing her mouth after using her steroid inhalers, as instructed.\par Reflux is improved; occurs when she take osteoporosis medications. She is on omeprazole 40 mg every day.\par She saw the GI doctor, who scheduled a December 6 procedure where they put something in her nose for 24 hours for reflux. (pH probe?)\par Pulmonary and ID doctors ordered CT chest to see if the lung infection is gone.\par On Saturday 11/20/21, she ate something hard, after which she had throat pain for 2 days. She felt like something was stuck in her throat and she had trouble swallowing her saliva. Sx disappeared 3 days ago. Today she has just a little discomfort in the throat.\par \par VISIT 10/27/2021\par White stuff on tongue and roof of mouth daily x 2 mouths. She picks off the white stuff from her mouth every day but worse in morning when she wakes from sleep. No pain in mouth. Able to swallow easily. No voice change. \par Nystatin S&S, prescribed by her PCP, was hard for her to use QID so she sometimes only used twice daily --> no change in symptoms.\par In early in 2021, she had same white coating in her mouth ; resolved after washing mouth with vinegar daily. Tried vinegar this time too but didn’t help. \par She reports esophageal candidiasis was diagnosed on endoscopy earlier this year; treated with an antibiotic x 10 days. She gets frequent burning in her chest, even on daily omeprazole. She last saw her GI doctor over 2 months ago. \par Recently on azithromycin, rifampin and ethambutol for a "infection in the lungs". (does not recognize the terms TB or tuberculosis.) Her pulmonary doctor told her to stop the medications 1 week ago because she feels that the meds caused her tongue symptoms.\par On Wixela inhaler daily, plus PRN albuterol.\par \par INITIAL VISIT 11/25/2021:\par Chronic intermittent throat pain for 10 months; worse in the morning when she wakes up. Pain is described as a burning sensation. Clearing throat a lot; feels like something is stuck in her throat. Difficult to swallow solids at times. Dryness in throat also. May have scleroderma. No change in sx after antibiotics.\par History of acid reflux, on omeprazole every day but still has heartburn. Endoscopy 2 years ago showed irritation in her stomach; reports that they gave her antibiotics afterward (?H.pylori). Has new appt with GI Dr. Madison next month.\par Has asthma and surrounded by dust at home. Also has seasonal allergies, on medications.\par No change in voice, recent illness or fever. \par

## 2023-05-19 NOTE — PROCEDURE
Airway  Urgency: elective    Date/Time: 7/9/2021 10:25 AM  Airway not difficult    General Information and Staff    Patient location during procedure: OR  Anesthesiologist: Mode Watson MD  CRNA: Gagan Hernandez CRNA    Indications and Patient Condition  Indications for airway management: airway protection    Preoxygenated: yes  MILS not maintained throughout  Mask difficulty assessment: 1 - vent by mask    Final Airway Details  Final airway type: endotracheal airway      Successful airway: ETT  Cuffed: yes   Successful intubation technique: direct laryngoscopy  Endotracheal tube insertion site: oral  Blade: Tony  Blade size: 3  ETT size (mm): 7.0  Cormack-Lehane Classification: grade I - full view of glottis  Placement verified by: chest auscultation and capnometry   Cuff volume (mL): 8  Measured from: lips  ETT/EBT  to lips (cm): 20  Number of attempts at approach: 1  Assessment: lips, teeth, and gum same as pre-op and atraumatic intubation    Additional Comments  Pre O2, SIAI            
Peripheral Block      Patient reassessed immediately prior to procedure    Patient location during procedure: OR  Start time: 7/9/2021 10:28 AM  Stop time: 7/9/2021 10:37 AM  Reason for block: at surgeon's request and post-op pain management  Performed by  Anesthesiologist: Mode Watson MD  Preanesthetic Checklist  Completed: patient identified, risks and benefits discussed and monitors and equipment checked  Prep:  Pt Position: supine  Prep: ChloraPrep  Patient monitoring: blood pressure monitoring, continuous pulse oximetry and EKG  Procedure  Sedation:yes  Performed under: general  Guidance:ultrasound guided  ULTRASOUND INTERPRETATION. Using ultrasound guidance a 21 G gauge needle was placed in close proximity to the nerve, at which point, under ultrasound guidance anesthetic was injected in the area of the nerve and spread of the anesthesia was seen on ultrasound in close proximity thereto.  There were no abnormalities seen on ultrasound; a digital image was taken; and the patient tolerated the procedure with no complications. Images:still images obtained, printed/placed on chart    Laterality:Bilateral  Block Type:TAP  Injection Technique:single-shot  Needle Type:short-bevel  Needle Gauge:21 G      Medications Used: bupivacaine liposome (EXPAREL) 1.3 % injection, 20 mL  bupivacaine (PF) (MARCAINE) 0.5 % injection, 20 mL  Med admintered at 7/9/2021 10:37 AM      Post Assessment  Patient Tolerance:comfortable throughout block  Complications:no  Additional Notes  Ultrasound Interpretation:  Using ultrasound guidance the needle was placed under the fascia  the internal oblique and transversus abdominus muscles.  Local anesthetic was seen  the muscle from the fascia.  There were no abnormalities seen on ultrasound; a digital image was taken; and the patient tolerated the procedure with no complications.                
[de-identified] : \par Indication: reflux\par -Verbal consent was obtained from patient prior to procedure.\par Flexible laryngoscopy was performed via mouth and revealed the following:\par   -- Pharyngeal mucosa is dry\par   -- Base of tongue was symmetric and not enlarged.\par   -- Vallecula was clear\par   -- Epiglottis, both aryepiglottic folds and both false vocal folds were normal\par   -- Arytenoids both with moderate edema and mild erythema \par   -- True vocal folds were fully mobile and without lesions. \par   -- Post cricoid area was clear.  No retained secretions or food/pills\par   -- Interarytenoid edema was present     \par   -- No lesions seen in laryngopharynx\par \par The patient tolerated the procedure well.\par \par

## 2023-05-19 NOTE — PHYSICAL EXAM
[FreeTextEntry1] : No hoarseness.  [de-identified] : Dry mucosa diffuse. [Midline] : trachea located in midline position [Laryngoscopy Performed] : laryngoscopy was performed, see procedure section for findings [de-identified] : a few teeth missing [de-identified] : dry mucosa [Normal] : no neck adenopathy

## 2023-05-19 NOTE — CONSULT LETTER
[Dear  ___] : Dear  [unfilled], [Courtesy Letter:] : I had the pleasure of seeing your patient, [unfilled], in my office today. [Please see my note below.] : Please see my note below. [Consult Closing:] : Thank you very much for allowing me to participate in the care of this patient.  If you have any questions, please do not hesitate to contact me. [Sincerely,] : Sincerely, [FreeTextEntry2] : Blayne Antonio MD\par [address] [facility]\par [place], [place] [id number]\par  [DrAbdullahi  ___] : Dr. NICHOLS [FreeTextEntry3] : \par Cinthia Vazquez MD \par Otolaryngology, Head and Neck Surgery \par \par

## 2023-05-19 NOTE — ASSESSMENT
[FreeTextEntry1] : Ms. MOE MCCRARY still has bothersome chronic intermittent sore throat (usually in morning) and pharyngeal dysphagia to pills and now crunchy/hard foods.  She is currently taking Dexilant in AM and famotidine in PM for GERD.  She also has chronic dry throat, mouth and nose.  Taking fluticasone nasal spray, which will be drying.\par On physical examination, she has moderate posterior laryngeal edema and dry mucus in the upper aerodigestive tract.  I do not seen any lesions or infection.\par Certainly her scleroderma can contribute to her sx.\par --> continue current meds\par --> saline nasal mist spray\par --> Biotene mouth/throat solution\par --> modified barium swallow (she was instructed to take with her some food that gives her problem swallowing since most of the items swallowed in that test are soft)\par \par Return in 2 months

## 2023-05-24 ENCOUNTER — NON-APPOINTMENT (OUTPATIENT)
Age: 63
End: 2023-05-24

## 2023-05-24 PROBLEM — J45.909 UNSPECIFIED ASTHMA, UNCOMPLICATED: Chronic | Status: ACTIVE | Noted: 2023-05-16

## 2023-05-24 PROBLEM — M34.9 SYSTEMIC SCLEROSIS, UNSPECIFIED: Chronic | Status: ACTIVE | Noted: 2023-05-16

## 2023-05-24 PROBLEM — L30.9 DERMATITIS, UNSPECIFIED: Chronic | Status: ACTIVE | Noted: 2023-05-17

## 2023-05-26 NOTE — ASU PATIENT PROFILE, ADULT - PRO ARRIVE FROM
haD/C from unit with belongings and RX. Accompanied by family and hospital personnel. No distress at time of D/C. Transported via w/c. home

## 2023-05-26 NOTE — ASU PATIENT PROFILE, ADULT - FALL HARM RISK - UNIVERSAL INTERVENTIONS
Bed in lowest position, wheels locked, appropriate side rails in place/Call bell, personal items and telephone in reach/Instruct patient to call for assistance before getting out of bed or chair/Non-slip footwear when patient is out of bed/[place] to call system/Physically safe environment - no spills, clutter or unnecessary equipment/Purposeful Proactive Rounding/Room/bathroom lighting operational, light cord in reach

## 2023-05-26 NOTE — ASU PATIENT PROFILE, ADULT - NSICDXPASTMEDICALHX_GEN_ALL_CORE_FT
PAST MEDICAL HISTORY:  Acid reflux     Asthma     Eczema lower legs and arms    Osteoporosis     Scleroderma

## 2023-05-26 NOTE — ASU PATIENT PROFILE, ADULT - NSICDXPASTSURGICALHX_GEN_ALL_CORE_FT
PAST SURGICAL HISTORY:  S/P colonoscopy      PAST SURGICAL HISTORY:  History of dental surgery     S/P colonoscopy

## 2023-05-30 ENCOUNTER — TRANSCRIPTION ENCOUNTER (OUTPATIENT)
Age: 63
End: 2023-05-30

## 2023-05-30 RX ORDER — CHLORHEXIDINE GLUCONATE 213 G/1000ML
1 SOLUTION TOPICAL DAILY
Refills: 0 | Status: DISCONTINUED | OUTPATIENT
Start: 2023-05-31 | End: 2023-05-31

## 2023-05-31 ENCOUNTER — TRANSCRIPTION ENCOUNTER (OUTPATIENT)
Age: 63
End: 2023-05-31

## 2023-05-31 ENCOUNTER — OUTPATIENT (OUTPATIENT)
Dept: OUTPATIENT SERVICES | Facility: HOSPITAL | Age: 63
LOS: 1 days | Discharge: HOME | End: 2023-05-31
Payer: MEDICAID

## 2023-05-31 ENCOUNTER — APPOINTMENT (OUTPATIENT)
Dept: ORTHOPEDIC SURGERY | Facility: AMBULATORY SURGERY CENTER | Age: 63
End: 2023-05-31

## 2023-05-31 ENCOUNTER — RESULT REVIEW (OUTPATIENT)
Age: 63
End: 2023-05-31

## 2023-05-31 VITALS
TEMPERATURE: 98 F | OXYGEN SATURATION: 100 % | HEIGHT: 59 IN | WEIGHT: 99.21 LBS | SYSTOLIC BLOOD PRESSURE: 134 MMHG | RESPIRATION RATE: 17 BRPM | DIASTOLIC BLOOD PRESSURE: 52 MMHG | HEART RATE: 74 BPM

## 2023-05-31 VITALS
OXYGEN SATURATION: 98 % | HEART RATE: 74 BPM | DIASTOLIC BLOOD PRESSURE: 68 MMHG | SYSTOLIC BLOOD PRESSURE: 138 MMHG | RESPIRATION RATE: 15 BRPM

## 2023-05-31 DIAGNOSIS — Z92.89 PERSONAL HISTORY OF OTHER MEDICAL TREATMENT: Chronic | ICD-10-CM

## 2023-05-31 DIAGNOSIS — Z98.890 OTHER SPECIFIED POSTPROCEDURAL STATES: Chronic | ICD-10-CM

## 2023-05-31 DIAGNOSIS — M70.20 OLECRANON BURSITIS, UNSPECIFIED ELBOW: ICD-10-CM

## 2023-05-31 LAB
GRAM STN FLD: SIGNIFICANT CHANGE UP
SPECIMEN SOURCE: SIGNIFICANT CHANGE UP

## 2023-05-31 PROCEDURE — 88305 TISSUE EXAM BY PATHOLOGIST: CPT | Mod: 26

## 2023-05-31 PROCEDURE — 24105 EXCISION OLECRANON BURSA: CPT | Mod: RT

## 2023-05-31 RX ORDER — SODIUM CHLORIDE 9 MG/ML
1000 INJECTION, SOLUTION INTRAVENOUS
Refills: 0 | Status: DISCONTINUED | OUTPATIENT
Start: 2023-05-31 | End: 2023-05-31

## 2023-05-31 RX ORDER — ONDANSETRON 8 MG/1
4 TABLET, FILM COATED ORAL ONCE
Refills: 0 | Status: DISCONTINUED | OUTPATIENT
Start: 2023-05-31 | End: 2023-05-31

## 2023-05-31 RX ORDER — FENTANYL CITRATE 50 UG/ML
25 INJECTION INTRAVENOUS
Refills: 0 | Status: DISCONTINUED | OUTPATIENT
Start: 2023-05-31 | End: 2023-05-31

## 2023-05-31 NOTE — ASU PREOP CHECKLIST - ORDERS/MEDICATION ADMINISTRATION RECORD ON CHART
Former long time smoker, quit 6 yrs ago.   Worsened after getting COVID 19 11/2020.  Low dose CT suggests COPD, check PFT's.   Given albuterol inhaler.   Start Anoro.     done

## 2023-05-31 NOTE — ASU DISCHARGE PLAN (ADULT/PEDIATRIC) - BATHING
Shower only Keep dressing clean dry and intact until follow up appointment/Sponge only/Do not submerge in water

## 2023-05-31 NOTE — ASU DISCHARGE PLAN (ADULT/PEDIATRIC) - NS MD DC FALL RISK RISK
For information on Fall & Injury Prevention, visit: [email or web address] OR  [email or web address] OR  https://www.cdc.gov/steadi/patient.html

## 2023-05-31 NOTE — ASU DISCHARGE PLAN (ADULT/PEDIATRIC) - CARE PROVIDER_API CALL
Vel Cuevas.  Orthopaedic Surgery  [address] [place] [address], [address]  [place] [address]  Phone: [phone]  Fax: [phone]  Established Patient  Follow Up Time:

## 2023-06-04 LAB
CULTURE RESULTS: NO GROWTH — SIGNIFICANT CHANGE UP
CULTURE RESULTS: NO GROWTH — SIGNIFICANT CHANGE UP
SPECIMEN SOURCE: SIGNIFICANT CHANGE UP
SPECIMEN SOURCE: SIGNIFICANT CHANGE UP

## 2023-06-05 LAB
CULTURE RESULTS: NO GROWTH — SIGNIFICANT CHANGE UP
SPECIMEN SOURCE: SIGNIFICANT CHANGE UP

## 2023-06-13 ENCOUNTER — APPOINTMENT (OUTPATIENT)
Dept: ORTHOPEDIC SURGERY | Facility: CLINIC | Age: 63
End: 2023-06-13
Payer: MEDICAID

## 2023-06-13 PROCEDURE — 99024 POSTOP FOLLOW-UP VISIT: CPT

## 2023-06-13 PROCEDURE — 29105 APPLICATION LONG ARM SPLINT: CPT | Mod: 58,RT

## 2023-06-16 ENCOUNTER — APPOINTMENT (OUTPATIENT)
Dept: RADIOLOGY | Facility: HOSPITAL | Age: 63
End: 2023-06-16
Payer: MEDICAID

## 2023-06-16 ENCOUNTER — OUTPATIENT (OUTPATIENT)
Dept: OUTPATIENT SERVICES | Facility: HOSPITAL | Age: 63
LOS: 1 days | End: 2023-06-16
Payer: MEDICAID

## 2023-06-16 DIAGNOSIS — Z92.89 PERSONAL HISTORY OF OTHER MEDICAL TREATMENT: Chronic | ICD-10-CM

## 2023-06-16 DIAGNOSIS — Z98.890 OTHER SPECIFIED POSTPROCEDURAL STATES: Chronic | ICD-10-CM

## 2023-06-16 PROCEDURE — 92611 MOTION FLUOROSCOPY/SWALLOW: CPT | Mod: GN

## 2023-06-16 PROCEDURE — 74230 X-RAY XM SWLNG FUNCJ C+: CPT | Mod: 26

## 2023-06-16 PROCEDURE — 74230 X-RAY XM SWLNG FUNCJ C+: CPT

## 2023-06-20 LAB — SURGICAL PATHOLOGY STUDY: SIGNIFICANT CHANGE UP

## 2023-06-27 ENCOUNTER — APPOINTMENT (OUTPATIENT)
Dept: ORTHOPEDIC SURGERY | Facility: CLINIC | Age: 63
End: 2023-06-27
Payer: MEDICAID

## 2023-06-27 PROCEDURE — 99024 POSTOP FOLLOW-UP VISIT: CPT

## 2023-07-01 LAB
CULTURE RESULTS: SIGNIFICANT CHANGE UP
SPECIMEN SOURCE: SIGNIFICANT CHANGE UP

## 2023-07-11 ENCOUNTER — APPOINTMENT (OUTPATIENT)
Dept: ORTHOPEDIC SURGERY | Facility: CLINIC | Age: 63
End: 2023-07-11
Payer: MEDICAID

## 2023-07-11 PROCEDURE — 99024 POSTOP FOLLOW-UP VISIT: CPT

## 2023-07-20 ENCOUNTER — APPOINTMENT (OUTPATIENT)
Dept: OTOLARYNGOLOGY | Facility: CLINIC | Age: 63
End: 2023-07-20
Payer: MEDICAID

## 2023-07-20 VITALS
TEMPERATURE: 97.5 F | HEIGHT: 59 IN | DIASTOLIC BLOOD PRESSURE: 67 MMHG | HEART RATE: 76 BPM | BODY MASS INDEX: 20.6 KG/M2 | SYSTOLIC BLOOD PRESSURE: 108 MMHG | WEIGHT: 102.2 LBS

## 2023-07-20 DIAGNOSIS — K11.7 DISTURBANCES OF SALIVARY SECRETION: ICD-10-CM

## 2023-07-20 DIAGNOSIS — R07.0 PAIN IN THROAT: ICD-10-CM

## 2023-07-20 DIAGNOSIS — G89.29 PAIN IN THROAT: ICD-10-CM

## 2023-07-20 DIAGNOSIS — R13.19 OTHER DYSPHAGIA: ICD-10-CM

## 2023-07-20 DIAGNOSIS — R13.13 DYSPHAGIA, PHARYNGEAL PHASE: ICD-10-CM

## 2023-07-20 DIAGNOSIS — J31.0 CHRONIC RHINITIS: ICD-10-CM

## 2023-07-20 DIAGNOSIS — K21.00 GASTRO-ESOPHAGEAL REFLUX DISEASE WITH ESOPHAGITIS, WITHOUT BLEEDING: ICD-10-CM

## 2023-07-20 PROCEDURE — 99214 OFFICE O/P EST MOD 30 MIN: CPT

## 2023-07-20 RX ORDER — LIDOCAINE HYDROCHLORIDE 20 MG/ML
2 SOLUTION ORAL; TOPICAL
Qty: 1 | Refills: 0 | Status: ACTIVE | COMMUNITY
Start: 2023-05-24 | End: 1900-01-01

## 2023-07-20 RX ORDER — ALBUTEROL 90 MCG
90 AEROSOL (GRAM) INHALATION
Refills: 0 | Status: COMPLETED | COMMUNITY
End: 2023-07-20

## 2023-07-20 RX ORDER — DEXLANSOPRAZOLE 60 MG/1
60 CAPSULE, DELAYED RELEASE ORAL
Refills: 0 | Status: COMPLETED | COMMUNITY
End: 2023-07-20

## 2023-07-20 RX ORDER — FLUTICASONE PROPIONATE AND SALMETEROL 250; 50 UG/1; UG/1
250-50 POWDER RESPIRATORY (INHALATION)
Refills: 0 | Status: COMPLETED | COMMUNITY
End: 2023-07-20

## 2023-07-23 PROBLEM — R13.13 PHARYNGEAL DYSPHAGIA: Status: RESOLVED | Noted: 2020-11-29 | Resolved: 2023-07-23

## 2023-07-23 PROBLEM — R13.19 ESOPHAGEAL DYSPHAGIA: Status: ACTIVE | Noted: 2023-07-23

## 2023-07-23 RX ORDER — PANTOPRAZOLE 40 MG/1
40 TABLET, DELAYED RELEASE ORAL
Refills: 0 | Status: ACTIVE | COMMUNITY

## 2023-07-23 NOTE — PHYSICAL EXAM
[Midline] : trachea located in midline position [FreeTextEntry1] : No hoarseness.  [de-identified] : Dry mucosa  [de-identified] : a few teeth missing [Normal] : no neck adenopathy [de-identified] : dry mucosa

## 2023-07-23 NOTE — CONSULT LETTER
[Dear  ___] : Dear  [unfilled], [Courtesy Letter:] : I had the pleasure of seeing your patient, [unfilled], in my office today. [Please see my note below.] : Please see my note below. [Consult Closing:] : Thank you very much for allowing me to participate in the care of this patient.  If you have any questions, please do not hesitate to contact me. [Sincerely,] : Sincerely, [FreeTextEntry2] : Blayne Antonio MD\par [address] [facility]\par [place], [place] [id number]\par  [FreeTextEntry3] : \par Cinthia Vazquez MD \par Otolaryngology, Head and Neck Surgery \par \par  [DrAbdullahi  ___] : Dr. NICHOLS [___] : [unfilled]

## 2023-07-23 NOTE — HISTORY OF PRESENT ILLNESS
[de-identified] : PMH: Scleroderma, asthma, GERD (?H.pylori hx), arthritis, glaucoma, osteoporosis\par PCP: Dr. Marcia Antonio\par Communication was facilitated by language line  Czech #[id number]\par \par Ms. MAZARIEGOS gets occasional sore throat and phlegm, not as often as her reflux is improving. \par Viscous lidocaine helped the sore throat.\par Taking pantoprazole 40mg in AM (instead of Dexilant) and famotidine at bedtime. Seeing GI Dr. Wolfe who wants her to go on a reflux diet.  She was also started on Miralax. \par Had modified barium swallow last month.\par She gets runny nose.  Not using fluticasone nasal spray, only saline spray.\par Gets dry mouth and nose still.\par No hoarseness. \par \par VISIT 5/19/2023\par Difficulty swallowing her pills and crunchy or hard foods is getting worse. Has to crush her big pills.\par Sometimes has throat pain, always in the mornings. Had URI 3 weeks ago, with sore throat.\par Taking Dexilant in AM and famotidine at bedtime.\par Using fluticasone nasal spray for allergies\par \par VISIT 10/05/2022\par Ms. MOE MCCRARY presents for mouth and throat problems.\par Communication was facilitated by language line  Czech #[id number].\par She has worse throat pain and dryness all the time for the past few months. She has trouble swallowing, especially large pills, for years.\par GI Dr Wolfe told her that she has gastritis. She has abdominal bloating often.\par She is on Dexilant 60 mg daily for GERD.\par Reportedly, EGD in July 2022 showed no candida infection.\par She is on meds for ASHLEY.\par No postnasal drip, voice change, ear pain or unexplained wt loss.\par \par VISIT 01/19/2022\par She had COViD infection December 29, 2021, which worsened her throat pain. No SOB. \par Now she has just a little bit of throat pain, worse in the morning. Sometimes she cant swallow her saliva due to pain but today it is better. She is gargling salt water. She is now on famotidine and omeprazole which is helping symptoms..\par She had a test where they looked down her nose into her stomach (pH probe?), she said no findings. She is following up with him in February. \par In the mornings she wakes up with her tongue white but not as bad as it was. The clotrimazole lozenges helped but she still gets the white coating. No tongue burning.\par \par VISIT 11/24/2021\par Started on clotrimazole lozenges 3 weeks ago for oral candidiasis. The white coating and redness on tongue and hard palate disappeared but started to return over past 2 days. Every morning she washes her mouth with vinegar. She is rinsing her mouth after using her steroid inhalers, as instructed.\par Reflux is improved; occurs when she take osteoporosis medications. She is on omeprazole 40 mg every day.\par She saw the GI doctor, who scheduled a December 6 procedure where they put something in her nose for 24 hours for reflux. (pH probe?)\par Pulmonary and ID doctors ordered CT chest to see if the lung infection is gone.\par On Saturday 11/20/21, she ate something hard, after which she had throat pain for 2 days. She felt like something was stuck in her throat and she had trouble swallowing her saliva. Sx disappeared 3 days ago. Today she has just a little discomfort in the throat.\par \par VISIT 10/27/2021\par White stuff on tongue and roof of mouth daily x 2 mouths. She picks off the white stuff from her mouth every day but worse in morning when she wakes from sleep. No pain in mouth. Able to swallow easily. No voice change. \par Nystatin S&S, prescribed by her PCP, was hard for her to use QID so she sometimes only used twice daily --> no change in symptoms.\par In early in 2021, she had same white coating in her mouth ; resolved after washing mouth with vinegar daily. Tried vinegar this time too but didn’t help. \par She reports esophageal candidiasis was diagnosed on endoscopy earlier this year; treated with an antibiotic x 10 days. She gets frequent burning in her chest, even on daily omeprazole. She last saw her GI doctor over 2 months ago. \par Recently on azithromycin, rifampin and ethambutol for a "infection in the lungs". (does not recognize the terms TB or tuberculosis.) Her pulmonary doctor told her to stop the medications 1 week ago because she feels that the meds caused her tongue symptoms.\par On Wixela inhaler daily, plus PRN albuterol.\par \par INITIAL VISIT 11/25/2021:\par Chronic intermittent throat pain for 10 months; worse in the morning when she wakes up. Pain is described as a burning sensation. Clearing throat a lot; feels like something is stuck in her throat. Difficult to swallow solids at times. Dryness in throat also. May have scleroderma. No change in sx after antibiotics.\par History of acid reflux, on omeprazole every day but still has heartburn. Endoscopy 2 years ago showed irritation in her stomach; reports that they gave her antibiotics afterward (?H.pylori). Has new appt with GI Dr. Madison next month.\par Has asthma and surrounded by dust at home. Also has seasonal allergies, on medications.\par No change in voice, recent illness or fever. \par \par \par Modified Barium Swallow  (6/16/2023) at [facility] \par IMPRESSION:\par - functional oropharyngeal swallow\par - mild to lower esophageal retention suggestive of seemingly reduced esophageal clearance\par

## 2023-07-23 NOTE — ASSESSMENT
[FreeTextEntry1] : Ms. MOE MCCRARY currently has no sore throat but still has occasional phlegm in her throat.  \par She is currently taking Pantoprazole in AM and famotidine in PM for GERD.\par Modified barium swallow showed functional pharyngeal swallow and mild to lower esophageal retention suggestive of seemingly reduced esophageal clearance.\par She has longtime difficulty swallowing large pills and now crunchy/hard foods, more related to chronic dry throat than obstruction.\par Her dry nose is also always dry.\par \par --> continue current meds for reflux \par --> continue follow up with GI doctor\par --> crush pills that are difficult to swallow\par --> continue saline spray, can use Flonase if allergies are bothersome\par --> XyliMelts for dry mouth (already tried Biotene)\par \par I would be happy to see her again as needed.

## 2023-08-10 ENCOUNTER — APPOINTMENT (OUTPATIENT)
Dept: ORTHOPEDIC SURGERY | Facility: CLINIC | Age: 63
End: 2023-08-10
Payer: MEDICAID

## 2023-08-10 DIAGNOSIS — M70.21 OLECRANON BURSITIS, RIGHT ELBOW: ICD-10-CM

## 2023-08-10 PROCEDURE — 99024 POSTOP FOLLOW-UP VISIT: CPT

## 2023-08-10 NOTE — HISTORY OF PRESENT ILLNESS
[de-identified] : Date of surgery: May 31, 2023 (10 weeks, 1 day) [de-identified] : The patient is returning for follow-up after undergoing a right olecranon bursectomy and debridement over 2 months ago.  She feels like she has been progressing appropriately, regaining more elbow and hand motion, comfort and function. [de-identified] : The patient's right posterior elbow incision is completely healed without signs of infection.  No expressible drainage.  No open wounds or sinus tracts.  Active right elbow ROM is from -5 to 145 degrees active flexion.  She is also regaining significantly more digital range of motion. [de-identified] : I reassured the patient that she has been progressing appropriately after surgery.  I encouraged she greater incorporate the right upper extremity into routine, daily activities and moderate ADLs.  I allowed her to get back to work in September (September 1, 2023), as she requested.  She will continue to work with therapy and do home exercises to optimize her outcome.  All questions were answered and she was in accordance with the plan.  She will follow-up with me as needed.

## (undated) DEVICE — SUT VICRYL 4-0 18" P-3 UNDYED

## (undated) DEVICE — TOURNIQUET CUFF 18" DUAL PORT SINGLE BLADDER W PLC  (BLACK)

## (undated) DEVICE — SUT MONOCRYL 4-0 18" PS-2

## (undated) DEVICE — MARKING PEN W RULER

## (undated) DEVICE — GLV 8 PROTEXIS (WHITE)

## (undated) DEVICE — SLV COMPRESSION KNEE MED

## (undated) DEVICE — SUT ETHILON 5-0 18" P-3

## (undated) DEVICE — GLV 8.5 PROTEXIS (WHITE)

## (undated) DEVICE — SUT ETHILON 4-0 18" FS-2

## (undated) DEVICE — DRSG KERLIX ROLL 4.5"

## (undated) DEVICE — PACK HAND

## (undated) DEVICE — DRAPE TOWEL BLUE 17" X 24"

## (undated) DEVICE — DRAPE IOBAN 23" X 23"

## (undated) DEVICE — DRAPE C ARM MINI PACK FOR 6800